# Patient Record
Sex: FEMALE | Race: WHITE | Employment: OTHER | URBAN - METROPOLITAN AREA
[De-identification: names, ages, dates, MRNs, and addresses within clinical notes are randomized per-mention and may not be internally consistent; named-entity substitution may affect disease eponyms.]

---

## 2019-03-25 ENCOUNTER — OFFICE VISIT (OUTPATIENT)
Dept: FAMILY MEDICINE CLINIC | Age: 79
End: 2019-03-25

## 2019-03-25 VITALS
TEMPERATURE: 98.1 F | RESPIRATION RATE: 18 BRPM | HEIGHT: 64 IN | DIASTOLIC BLOOD PRESSURE: 65 MMHG | OXYGEN SATURATION: 97 % | HEART RATE: 75 BPM | BODY MASS INDEX: 27.21 KG/M2 | SYSTOLIC BLOOD PRESSURE: 108 MMHG | WEIGHT: 159.4 LBS

## 2019-03-25 DIAGNOSIS — Z86.73 HISTORY OF CVA (CEREBROVASCULAR ACCIDENT): ICD-10-CM

## 2019-03-25 DIAGNOSIS — M35.00 SJOGREN'S SYNDROME WITHOUT EXTRAGLANDULAR INVOLVEMENT (HCC): ICD-10-CM

## 2019-03-25 DIAGNOSIS — J30.89 ENVIRONMENTAL AND SEASONAL ALLERGIES: ICD-10-CM

## 2019-03-25 DIAGNOSIS — I10 ESSENTIAL HYPERTENSION: Primary | ICD-10-CM

## 2019-03-25 DIAGNOSIS — E78.5 HYPERLIPIDEMIA, UNSPECIFIED HYPERLIPIDEMIA TYPE: ICD-10-CM

## 2019-03-25 DIAGNOSIS — L93.0 LUPUS ERYTHEMATOSUS, UNSPECIFIED FORM: ICD-10-CM

## 2019-03-25 DIAGNOSIS — R21 RASH: ICD-10-CM

## 2019-03-25 DIAGNOSIS — F51.01 PRIMARY INSOMNIA: ICD-10-CM

## 2019-03-25 RX ORDER — ZOLPIDEM TARTRATE 12.5 MG/1
12.5 TABLET, FILM COATED, EXTENDED RELEASE ORAL
COMMUNITY
End: 2019-03-25 | Stop reason: DRUGHIGH

## 2019-03-25 RX ORDER — AMLODIPINE BESYLATE 10 MG/1
TABLET ORAL DAILY
COMMUNITY
End: 2019-03-25 | Stop reason: SDUPTHER

## 2019-03-25 RX ORDER — DULOXETIN HYDROCHLORIDE 20 MG/1
20 CAPSULE, DELAYED RELEASE ORAL
COMMUNITY

## 2019-03-25 RX ORDER — ZOLPIDEM TARTRATE 5 MG/1
5 TABLET ORAL
Qty: 30 TAB | Refills: 3 | Status: SHIPPED | OUTPATIENT
Start: 2019-03-25 | End: 2019-10-06 | Stop reason: SDUPTHER

## 2019-03-25 RX ORDER — HYDROGEN PEROXIDE 3 %
20 SOLUTION, NON-ORAL MISCELLANEOUS
COMMUNITY

## 2019-03-25 RX ORDER — ROSUVASTATIN CALCIUM 10 MG/1
10 TABLET, COATED ORAL
COMMUNITY
End: 2019-03-25 | Stop reason: SDUPTHER

## 2019-03-25 RX ORDER — CETIRIZINE HCL 10 MG
10 TABLET ORAL DAILY
Qty: 90 TAB | Refills: 0 | Status: SHIPPED | OUTPATIENT
Start: 2019-03-25 | End: 2019-10-06

## 2019-03-25 RX ORDER — ROSUVASTATIN CALCIUM 5 MG/1
5 TABLET, COATED ORAL DAILY
Qty: 90 TAB | Refills: 3 | Status: SHIPPED | OUTPATIENT
Start: 2019-03-25 | End: 2020-04-15

## 2019-03-25 RX ORDER — AMLODIPINE BESYLATE 10 MG/1
10 TABLET ORAL DAILY
Qty: 90 TAB | Refills: 3 | Status: SHIPPED | OUTPATIENT
Start: 2019-03-25 | End: 2019-05-08 | Stop reason: SINTOL

## 2019-03-25 RX ORDER — FLUTICASONE PROPIONATE 50 MCG
2 SPRAY, SUSPENSION (ML) NASAL DAILY
Qty: 1 BOTTLE | Refills: 3 | Status: SHIPPED | OUTPATIENT
Start: 2019-03-25

## 2019-03-25 RX ORDER — HYDROCHLOROTHIAZIDE 12.5 MG/1
12.5 CAPSULE ORAL DAILY
COMMUNITY
End: 2019-03-25 | Stop reason: SDUPTHER

## 2019-03-25 RX ORDER — HYDROCHLOROTHIAZIDE 12.5 MG/1
12.5 CAPSULE ORAL DAILY
Qty: 90 CAP | Refills: 3 | Status: SHIPPED | OUTPATIENT
Start: 2019-03-25 | End: 2020-05-04 | Stop reason: SDUPTHER

## 2019-03-25 RX ORDER — HYDROXYCHLOROQUINE SULFATE 200 MG/1
TABLET, FILM COATED ORAL
COMMUNITY

## 2019-03-25 NOTE — PROGRESS NOTES
Angelic Angeles is a 67 y.o. female  Chief Complaint   Patient presents with   [address]     66 y/o female patient here today to establish care, patient is a former patient of Dr. Chetan Merchant in [place], recently moved    Rash     left arm red and blotchy, states feels like sand paper x3 months,      Visit Vitals  /65 (BP 1 Location: Right arm, BP Patient Position: Sitting)   Pulse 75   Temp 98.1 °F (36.7 °C) (Oral)   Resp 18   Ht 5' 4\" (1.626 m)   Wt 159 lb 6.4 oz (72.3 kg)   SpO2 97%   BMI 27.36 kg/m²     1. Have you been to the ER, urgent care clinic since your last visit? Hospitalized since your last visit? No    2. Have you seen or consulted any other health care providers outside of the [address] [place] [address] since your last visit? Include any pap smears or colon screening.  No  Health Maintenance Due   Topic Date Due    DTaP/Tdap/Td series (1 - Tdap) 02/18/1962    Shingrix Vaccine Age 50> (1 of 2) 02/18/1991    GLAUCOMA SCREENING Q2Y  02/18/2006    Bone Densitometry (Dexa) Screening  02/18/2006    Pneumococcal 65+ years (1 of 2 - PCV13) 02/18/2006    Influenza Age 9 to Adult  08/01/2018

## 2019-03-25 NOTE — PROGRESS NOTES
History of Present Illness:     Chief Complaint   Patient presents with   [address]     68 y/o female patient here today to establish care, patient is a former patient of Dr. Eliot Denver in [place], recently moved    Rash     left arm red and blotchy, states feels like sand paper x3 months,      Pt is a 67y.o. year old female    Presents to clinic for establishment of care.   Previously followed by Dr. Eliot Denver in [place]  Recently moved her to be closer to her son    C/o rash today  Started 3 months ago  Red, patches scattered on her arms and body  Tried eczema creams, [employer]  No new exposures  Uses Dove soap  Only known allergy is shelled fish    Lupus  Diagnosed at age 33  Taking Hydroxychloroquine   Followed by Rheumatology (Dr. Davida Keller)     Sjogren's syndrome  Dx 25 years ago    Prior stroke  10 years ago  Regained function of her left arm after that  Previously on ASA but stopped due to kidney disease  Taking Crestor    HTN  Taking HCTZ and Amlodipine    GERD  Using PPI which helps    Neuropathic pain  Taking Cymbalta    Insomnia  Takes Ambien nightly for sleep    I have reviewed patient's history as detailed below:    Past Medical History:   Diagnosis Date    CVA (cerebral vascular accident) ([address] 2009    Lupus     [facility]'s [facility]     Sjogren's syndrome ([address]          Past Surgical History:   Procedure Laterality Date    HX BREAST REDUCTION      HX BUNIONECTOMY      HX HYSTERECTOMY      HX KNEE REPLACEMENT      HX LUMBAR FUSION      HX LUMBAR LAMINECTOMY      HX ROTATOR CUFF REPAIR           Family History   Problem Relation Age of Onset    Diabetes Mother     Heart Attack Father          Social History     Socioeconomic History    Marital status:      Spouse name: Not on file    Number of children: Not on file    Years of education: Not on file    Highest education level: Not on file   Occupational History    Not on file   Social Needs    Financial resource strain: Not on file    Food insecurity:     Worry: Not on file     Inability: Not on file    Transportation needs:     Medical: Not on file     Non-medical: Not on file   Tobacco Use    Smoking status: Never Smoker    Smokeless tobacco: Never Used   Substance and Sexual Activity    Alcohol use: Yes     Comment: social    Drug use: Never    Sexual activity: Not Currently   Lifestyle    Physical activity:     Days per week: Not on file     Minutes per session: Not on file    Stress: Not on file   Relationships    Social connections:     Talks on phone: Not on file     Gets together: Not on file     Attends Buddhist service: Not on file     Active member of club or organization: Not on file     Attends meetings of clubs or organizations: Not on file     Relationship status: Not on file    Intimate partner violence:     Fear of current or ex partner: Not on file     Emotionally abused: Not on file     Physically abused: Not on file     Forced sexual activity: Not on file   Other Topics Concern    Not on file   Social History Narrative    Not on file         Current Outpatient Medications on File Prior to Visit   Medication Sig Dispense Refill    DULoxetine (CYMBALTA) 20 mg capsule Take 20 mg by mouth.  hydroxychloroquine (PLAQUENIL) 200 mg tablet Take  by mouth.  esomeprazole (NEXIUM) 20 mg capsule Take 20 mg by mouth. No current facility-administered medications on file prior to visit. Allergies: Allergies   Allergen Reactions    Codeine Anaphylaxis    Penicillins Anaphylaxis    Adhesive Tape-Silicones Other (comments)    Methotrexate Contact Dermatitis    Povidone-Iodine Other (comments)    Sulfa (Sulfonamide Antibiotics) Nausea and Vomiting         Review of systems:  Items bolded if positive.   Constitutional: Fever, chills, night sweats, weight loss, lymphadenopathy, fatigue  HEENT: Vision change, eye pain, rhinorrhea, sinus pain, epistaxis, dysphagia, change in hearing, tinnitus, vertigo. Endocrine: Weight change, heat/ cold intolerance, tremor, insomnia, polyuria, polydipsia, polyphagia, abnl hair growth, nail changes  Cardiovascular: Chest pain, palpitations, syncope, lower extremity edema, orthopnea, paroxysmal nocturnal dyspnea  Pulmonary: Shortness of breath, dyspnea on exertion, cough, hemoptysis, wheezing  GI: Nausea, vomiting, diarrhea, melena, hematochezia, change in appetite, abdominal pain, change in bowel habits or stools  : Dysuria, frequency, urgency, incontinence, hematuria, nocturia  Musculoskeletal: joint swelling or pain, muscle pain, back pain  Skin:  Rash, New/growing/changing skin lesions  Neurologic: Headache, muscle weakness, paresthesias, anesthesia, ataxia, change in speech, change in gait   Psychiatric: depression, anxiety, hallucinations, yo, SI/HI      Objective:     Vitals:    03/25/19 1001   BP: 108/65   Pulse: 75   Resp: 18   Temp: 98.1 °F (36.7 °C)   TempSrc: Oral   SpO2: 97%   Weight: 159 lb 6.4 oz (72.3 kg)   Height: 5' 4\" (1.626 m)       Physical Exam:  General appearance - alert, well appearing, and in no distress  Mental status - alert, oriented to person, place, and time, normal mood, behavior, speech, dress, motor activity, and thought processes  Ears - bilateral TM's and external ear canals normal  Nose - normal and patent, no erythema, discharge or polyps  Mouth - mucous membranes moist, pharynx normal without lesions  Chest - clear to auscultation, no wheezes, rales or rhonchi, symmetric air entry  Heart - normal rate, regular rhythm, normal S1, S2, no murmurs, rubs, clicks or gallops  Abdomen - soft, nontender, nondistended, no masses or organomegaly  Neurological - alert, oriented, normal speech, no focal findings or movement disorder noted  Extremities - peripheral pulses normal, no pedal edema, no clubbing or cyanosis  Skin - Erythematous, rough flat patches scattered along her arms.        Recent Labs:  No results found for this or any previous visit (from the past 12 hour(s)). Assessment and Plan:   Pt is a 67y.o. year old female,      ICD-10-CM ICD-9-CM    1. Essential hypertension I10 401.9 hydroCHLOROthiazide (MICROZIDE) 12.5 mg capsule      amLODIPine (NORVASC) 10 mg tablet   2. History of CVA (cerebrovascular accident) Z86.73 V12.54    3. Lupus erythematosus, unspecified form L93.0 695.4    4. Sjogren's syndrome without extraglandular involvement ([facility] [address] M35.00 710.2    5. Primary insomnia F51.01 307.42 zolpidem (AMBIEN) 5 mg tablet   6. Hyperlipidemia, unspecified hyperlipidemia type E78.5 272.4 rosuvastatin (CRESTOR) 5 mg tablet   7. Environmental and seasonal allergies J30.89 477.8 fluticasone propionate (FLONASE) 50 mcg/actuation nasal spray      cetirizine (ZYRTEC) 10 mg tablet   8. Rash R21 782.1 cetirizine (ZYRTEC) 10 mg tablet     Reviewed diagnoses and active problems  Refilled needed medications    Will trial on Zyrtec for rash; appears to be reactive  Can trial Hydrocortisone cream for itch    Will need to get her prior records    Follow up in 6 months    Nissa Cee MD     I have discussed the diagnosis with the patient and the intended plan as seen in the above orders. The patient has received an after-visit summary and questions were answered concerning future plans.

## 2019-03-25 NOTE — PATIENT INSTRUCTIONS
Allergies: Care Instructions  Your Care Instructions    Allergies occur when your body's defense system (immune system) overreacts to certain substances. The immune system treats a harmless substance as if it were a harmful germ or virus. Many things can cause this overreaction, including pollens, medicine, food, dust, animal dander, and mold. Allergies can be mild or severe. Mild allergies can be managed with home treatment. But medicine may be needed to prevent problems. Managing your allergies is an important part of staying healthy. Your doctor may suggest that you have allergy testing to help find out what is causing your allergies. When you know what things trigger your symptoms, you can avoid them. This can prevent allergy symptoms and other health problems. For severe allergies that cause reactions that affect your whole body (anaphylactic reactions), your doctor may prescribe a shot of epinephrine to carry with you in case you have a severe reaction. Learn how to give yourself the shot and keep it with you at all times. Make sure it is not . Follow-up care is a key part of your treatment and safety. Be sure to make and go to all appointments, and call your doctor if you are having problems. It's also a good idea to know your test results and keep a list of the medicines you take. How can you care for yourself at home? · If you have been told by your doctor that dust or dust mites are causing your allergy, decrease the dust around your bed:  ? Wash sheets, pillowcases, and other bedding in hot water every week. ? Use dust-proof covers for pillows, duvets, and mattresses. Avoid plastic covers because they tear easily and do not \"breathe. \" Wash as instructed on the label. ? Do not use any blankets and pillows that you do not need. ? Use blankets that you can wash in your washing machine. ? Consider removing drapes and carpets, which attract and hold dust, from your bedroom.   · If you are allergic to house dust and mites, do not use home humidifiers. Your doctor can suggest ways you can control dust and mites. · Look for signs of cockroaches. Cockroaches cause allergic reactions. Use cockroach baits to get rid of them. Then, clean your home well. Cockroaches like areas where grocery bags, newspapers, empty bottles, or cardboard boxes are stored. Do not keep these inside your home, and keep trash and food containers sealed. Seal off any spots where cockroaches might enter your home. · If you are allergic to mold, get rid of furniture, rugs, and drapes that smell musty. Check for mold in the bathroom. · If you are allergic to outdoor pollen or mold spores, use air-conditioning. Change or clean all filters every month. Keep windows closed. · If you are allergic to pollen, stay inside when pollen counts are high. Use a vacuum  with a HEPA filter or a double-thickness filter at least two times each week. · Stay inside when air pollution is bad. Avoid paint fumes, perfumes, and other strong odors. · Avoid conditions that make your allergies worse. Stay away from smoke. Do not smoke or let anyone else smoke in your house. Do not use fireplaces or wood-burning stoves. · If you are allergic to your pets, change the air filter in your furnace every month. Use high-efficiency filters. · If you are allergic to pet dander, keep pets outside or out of your bedroom. Old carpet and cloth furniture can hold a lot of animal dander. You may need to replace them. When should you call for help? Give an epinephrine shot if:    · You think you are having a severe allergic reaction.     · You have symptoms in more than one body area, such as mild nausea and an itchy mouth.    After giving an epinephrine shot call 911, even if you feel better.   Call 911 if:    · You have symptoms of a severe allergic reaction. These may include:  ? Sudden raised, red areas (hives) all over your body. ?  Swelling of the throat, mouth, lips, or tongue. ? Trouble breathing. ? Passing out (losing consciousness). Or you may feel very lightheaded or suddenly feel weak, confused, or restless.     · You have been given an epinephrine shot, even if you feel better.    Call your doctor now or seek immediate medical care if:    · You have symptoms of an allergic reaction, such as:  ? A rash or hives (raised, red areas on the skin). ? Itching. ? Swelling. ? Belly pain, nausea, or vomiting.    Watch closely for changes in your health, and be sure to contact your doctor if:    · You do not get better as expected. Where can you learn more? Go to http://fredrick-rodriguez.info/. Enter L633 in the search box to learn more about \"Allergies: Care Instructions. \"  Current as of: June 27, 2018  Content Version: 11.9  © 3932-6573 [employer]. Care instructions adapted under license by [employer] (which disclaims liability or warranty for this information). If you have questions about a medical condition or this instruction, always ask your healthcare professional. [address] any warranty or liability for your use of this information.

## 2019-03-26 PROBLEM — D50.9 MICROCYTIC ANEMIA: Status: ACTIVE | Noted: 2019-03-26

## 2019-03-26 PROBLEM — M32.9 SYSTEMIC LUPUS ERYTHEMATOSUS (HCC): Status: ACTIVE | Noted: 2019-03-25

## 2019-03-26 PROBLEM — N18.30 CKD (CHRONIC KIDNEY DISEASE) STAGE 3, GFR 30-59 ML/MIN (HCC): Status: ACTIVE | Noted: 2019-03-26

## 2019-03-26 PROBLEM — E87.1 HYPONATREMIA: Status: ACTIVE | Noted: 2019-03-26

## 2019-05-02 ENCOUNTER — OFFICE VISIT (OUTPATIENT)
Dept: FAMILY MEDICINE CLINIC | Age: 79
End: 2019-05-02

## 2019-05-02 VITALS
WEIGHT: 166 LBS | HEIGHT: 64 IN | TEMPERATURE: 98.4 F | OXYGEN SATURATION: 99 % | BODY MASS INDEX: 28.34 KG/M2 | DIASTOLIC BLOOD PRESSURE: 69 MMHG | RESPIRATION RATE: 18 BRPM | HEART RATE: 80 BPM | SYSTOLIC BLOOD PRESSURE: 118 MMHG

## 2019-05-02 DIAGNOSIS — R60.0 BILATERAL LEG EDEMA: Primary | ICD-10-CM

## 2019-05-02 DIAGNOSIS — Z78.9 HISTORY OF RECENT AIR TRAVEL: ICD-10-CM

## 2019-05-02 RX ORDER — FUROSEMIDE 20 MG/1
20 TABLET ORAL DAILY
Qty: 3 TAB | Refills: 0 | Status: SHIPPED | OUTPATIENT
Start: 2019-05-02 | End: 2019-05-05

## 2019-05-02 NOTE — PROGRESS NOTES
Randa Painter  67 y.o. female  1940  [address] [facility]  <[id number]>   [address]: Progress Note  Cindy Huynh MD       Encounter Date: 5/2/2019    Chief Complaint   Patient presents with    Leg Swelling     History of Present Illness   Randa Painter is a 67 y.o. female who presents to clinic today for for concerns of leg swelling bilaterally. Patient was recent air travel to [facility]. While she was there she did a lot of walking. Notes this was an enjoyable trip but since she is return she has increased edema of her lower extremities. Denies significant pain at this time. Denies redness at this time. Review of Systems   Review of Systems - Cardiovascular ROS: + lower extremity edema. Denies orthopnea, PND, dyspnea on exertion. Denies shortness of breath. Denies lower extremity erythema or pain. Vitals/Objective:     Vitals:    05/02/19 1901   BP: 118/69   Pulse: 80   Resp: 18   Temp: 98.4 °F (36.9 °C)   TempSrc: Oral   SpO2: 99%   Weight: 166 lb (75.3 kg)   Height: 5' 4\" (1.626 m)     Body mass index is 28.49 kg/m². General: Patient alert and oriented and in NAD  Heart: Regular rate and rhythm, No murmurs, rubs or gallops. 2+ peripheral pulses  Lungs: Clear to auscultation bilaterally, no wheezing, rales or rhonchi  Ext: 1-2+ pitting edema lower extremities bilaterally. Skin: No rashes or lesions noted on exposed skin      Assessment and Plan:   1. Bilateral leg edema  Patient increased risk of lower extremity DVT given recent air travel. Will get lower extremity Doppler. Also follow-up on metabolic causes for lower extremity edema to rule these out. Given 3 days of Lasix. I will follow-up with her once I get the results back from her duplex. Patient is on amlodipine for hypertension which could potentially play a role, however again I would like to make sure we rule out DVT given her risk.  - furosemide (LASIX) 20 mg tablet;  Take 1 Tab by mouth daily for 3 days. Dispense: 3 Tab; Refill: 0  - DUPLEX LOWER EXT VENOUS BILAT; Future  - BNP  - HGB & HCT  - METABOLIC PANEL, COMPREHENSIVE    2. History of recent air travel        I have discussed the diagnosis with the patient and the intended plan as seen in the above orders. she has expressed understanding. The patient has received an after-visit summary and questions were answered concerning future plans. I have discussed medication side effects and warnings with the patient as well. Electronically Signed: Cristofer Jennings MD     History   Patients past medical, surgical and family histories were reviewed and updated.     Past Medical History:   Diagnosis Date    CVA (cerebral vascular accident) ([facility] [address] 2009    Lupus ([facility] [address] [facility]     Sjogren's disease ([facility]     Sjogren's syndrome ([facility] [address] [facility]      Past Surgical History:   Procedure Laterality Date    HX BREAST REDUCTION      HX BUNIONECTOMY      HX HYSTERECTOMY      HX KNEE REPLACEMENT      HX LUMBAR FUSION      HX LUMBAR LAMINECTOMY      HX ROTATOR CUFF REPAIR       Family History   Problem Relation Age of Onset    Diabetes Mother     Heart Attack Father      Social History     Socioeconomic History    Marital status:      Spouse name: Not on file    Number of children: Not on file    Years of education: Not on file    Highest education level: Not on file   Occupational History    Not on file   Social Needs    Financial resource strain: Not on file    Food insecurity:     Worry: Not on file     Inability: Not on file    Transportation needs:     Medical: Not on file     Non-medical: Not on file   Tobacco Use    Smoking status: Never Smoker    Smokeless tobacco: Never Used   Substance and Sexual Activity    Alcohol use: Yes     Comment: social    Drug use: Never    Sexual activity: Not Currently   Lifestyle    Physical activity:     Days per week: Not on file     Minutes per session: Not on file    Stress: Not on file   Relationships    Social connections:     Talks on phone: Not on file     Gets together: Not on file     Attends U[employer] service: Not on file     Active member of club or organization: Not on file     Attends meetings of clubs or organizations: Not on file     Relationship status: Not on file    Intimate partner violence:     Fear of current or ex partner: Not on file     Emotionally abused: Not on file     Physically abused: Not on file     Forced sexual activity: Not on file   Other Topics Concern    Not on file   Social History Narrative    Not on file            Current Medications/Allergies     Current Outpatient Medications   Medication Sig Dispense Refill    furosemide (LASIX) 20 mg tablet Take 1 Tab by mouth daily for 3 days. 3 Tab 0    DULoxetine (CYMBALTA) 20 mg capsule Take 20 mg by mouth.  hydroxychloroquine (PLAQUENIL) 200 mg tablet Take  by mouth.  esomeprazole (NEXIUM) 20 mg capsule Take 20 mg by mouth.  rosuvastatin (CRESTOR) 5 mg tablet Take 1 Tab by mouth daily. 90 Tab 3    zolpidem (AMBIEN) 5 mg tablet Take 1 Tab by mouth nightly as needed for Sleep. Max Daily Amount: 5 mg. 30 Tab 3    hydroCHLOROthiazide (MICROZIDE) 12.5 mg capsule Take 1 Cap by mouth daily. 90 Cap 3    amLODIPine (NORVASC) 10 mg tablet Take 1 Tab by mouth daily. 90 Tab 3    fluticasone propionate (FLONASE) 50 mcg/actuation nasal spray 2 Sprays by Both Nostrils route daily. 1 Bottle 3    cetirizine (ZYRTEC) 10 mg tablet Take 1 Tab by mouth daily.  90 Tab 0     Allergies   Allergen Reactions    Codeine Anaphylaxis    Penicillins Anaphylaxis    Adhesive Tape-Silicones Other (comments)    Methotrexate Contact Dermatitis    Povidone-Iodine Other (comments)    Sulfa (Sulfonamide Antibiotics) Nausea and Vomiting

## 2019-05-02 NOTE — PATIENT INSTRUCTIONS
Leg and Ankle Edema: Care Instructions Your Care Instructions Swelling in the legs, ankles, and feet is called edema. It is common after you sit or stand for a while. Long plane flights or car rides often cause swelling in the legs and feet. You may also have swelling if you have to stand for long periods of time at your job. Problems with the veins in the legs (varicose veins) and changes in hormones can also cause swelling. Sometimes the swelling in the ankles and feet is caused by a more serious problem, such as heart failure, infection, blood clots, or liver or kidney disease. Follow-up care is a key part of your treatment and safety. Be sure to make and go to all appointments, and call your doctor if you are having problems. It's also a good idea to know your test results and keep a list of the medicines you take. How can you care for yourself at home? · If your doctor gave you medicine, take it as prescribed. Call your doctor if you think you are having a problem with your medicine. · Whenever you are resting, raise your legs up. Try to keep the swollen area higher than the level of your heart. · Take breaks from standing or sitting in one position. ? Walk around to increase the blood flow in your lower legs. ? Move your feet and ankles often while you stand, or tighten and relax your leg muscles. · Wear support stockings. Put them on in the morning, before swelling gets worse. · Eat a balanced diet. Lose weight if you need to. · Limit the amount of salt (sodium) in your diet. Salt holds fluid in the body and may increase swelling. When should you call for help? Call 911 anytime you think you may need emergency care. For example, call if: 
  · You have symptoms of a blood clot in your lung (called a pulmonary embolism). These may include: 
? Sudden chest pain. ? Trouble breathing. ? Coughing up blood.  
 Call your doctor now or seek immediate medical care if:   · You have signs of a blood clot, such as: 
? Pain in your calf, back of the knee, thigh, or groin. ? Redness and swelling in your leg or groin.  
  · You have symptoms of infection, such as: 
? Increased pain, swelling, warmth, or redness. ? Red streaks or pus. ? A fever.  
 Watch closely for changes in your health, and be sure to contact your doctor if: 
  · Your swelling is getting worse.  
  · You have new or worsening pain in your legs.  
  · You do not get better as expected. Where can you learn more? Go to http://fredrick-rodriguez.info/. Enter O584 in the search box to learn more about \"Leg and Ankle Edema: Care Instructions. \" Current as of: September 23, 2018 Content Version: 11.9 © 2976-0257 [employer]. Care instructions adapted under license by [employer] (which disclaims liability or warranty for this information). If you have questions about a medical condition or this instruction, always ask your healthcare professional. [place] [address] any warranty or liability for your use of this information.

## 2019-05-03 ENCOUNTER — HOSPITAL ENCOUNTER (OUTPATIENT)
Dept: LAB | Age: 79
Discharge: HOME | End: 2019-05-03
Payer: MEDICARE

## 2019-05-03 ENCOUNTER — HOSPITAL ENCOUNTER (OUTPATIENT)
Dept: VASCULAR SURGERY | Age: 79
Discharge: HOME | End: 2019-05-03
Attending: FAMILY MEDICINE
Payer: MEDICARE

## 2019-05-03 ENCOUNTER — LAB ONLY (OUTPATIENT)
Dept: FAMILY MEDICINE CLINIC | Age: 79
End: 2019-05-03

## 2019-05-03 DIAGNOSIS — R60.0 BILATERAL LEG EDEMA: ICD-10-CM

## 2019-05-03 PROCEDURE — 83880 ASSAY OF NATRIURETIC PEPTIDE: CPT

## 2019-05-03 PROCEDURE — 85018 HEMOGLOBIN: CPT

## 2019-05-03 PROCEDURE — 80053 COMPREHEN METABOLIC PANEL: CPT

## 2019-05-03 PROCEDURE — 36415 COLL VENOUS BLD VENIPUNCTURE: CPT

## 2019-05-03 PROCEDURE — 93970 EXTREMITY STUDY: CPT

## 2019-05-04 LAB
ALBUMIN SERPL-MCNC: 3.9 G/DL (ref 3.5–4.8)
ALBUMIN/GLOB SERPL: 1.3 {RATIO} (ref 1.2–2.2)
ALP SERPL-CCNC: 192 IU/L (ref 39–117)
ALT SERPL-CCNC: 12 IU/L (ref 0–32)
AST SERPL-CCNC: 27 IU/L (ref 0–40)
BILIRUB SERPL-MCNC: 0.4 MG/DL (ref 0–1.2)
BNP SERPL-MCNC: 57.4 PG/ML (ref 0–100)
BUN SERPL-MCNC: 17 MG/DL (ref 8–27)
BUN/CREAT SERPL: 15 (ref 12–28)
CALCIUM SERPL-MCNC: 9 MG/DL (ref 8.7–10.3)
CHLORIDE SERPL-SCNC: 99 MMOL/L (ref 96–106)
CO2 SERPL-SCNC: 24 MMOL/L (ref 20–29)
CREAT SERPL-MCNC: 1.15 MG/DL (ref 0.57–1)
GLOBULIN SER CALC-MCNC: 3 G/DL (ref 1.5–4.5)
GLUCOSE SERPL-MCNC: 107 MG/DL (ref 65–99)
HCT VFR BLD AUTO: 34.3 % (ref 34–46.6)
HGB BLD-MCNC: 10.3 G/DL (ref 11.1–15.9)
INTERPRETATION: NORMAL
POTASSIUM SERPL-SCNC: 4.1 MMOL/L (ref 3.5–5.2)
PROT SERPL-MCNC: 6.9 G/DL (ref 6–8.5)
SODIUM SERPL-SCNC: 136 MMOL/L (ref 134–144)

## 2019-05-06 ENCOUNTER — TELEPHONE (OUTPATIENT)
Dept: FAMILY MEDICINE CLINIC | Age: 79
End: 2019-05-06

## 2019-05-06 NOTE — TELEPHONE ENCOUNTER
Attempted to notify patient of negative LE doppler U/S. Number available not accepting calls at this time. Will attempt to call back later.

## 2019-05-07 NOTE — TELEPHONE ENCOUNTER
Dr. Ashleigh Hi: Today   Message Contents   Charlane Kawasaki, [address]             Pt is inquiring about her lab results from her recent visit.  Best contact number is [phone].

## 2019-05-08 NOTE — TELEPHONE ENCOUNTER
I sent a [employer] message to patient. Was unable to leave a message. Relayed note from Dr Omari Regan.

## 2019-05-08 NOTE — TELEPHONE ENCOUNTER
Please let patient know:  Okay to stop amlodipine. If patient can check blood pressure at home, please do this once daily for 2 weeks after stopping medication. If BP averages over 140/90, she should call us, as we may want to increase her hydrochlorothiazide. If she does not have a way to check her blood pressure, she should schedule an appointment in 1-2 weeks for a blood pressure follow-up.

## 2019-05-16 ENCOUNTER — OFFICE VISIT (OUTPATIENT)
Dept: FAMILY MEDICINE CLINIC | Age: 79
End: 2019-05-16

## 2019-05-16 VITALS
BODY MASS INDEX: 28 KG/M2 | TEMPERATURE: 98 F | DIASTOLIC BLOOD PRESSURE: 80 MMHG | OXYGEN SATURATION: 100 % | SYSTOLIC BLOOD PRESSURE: 147 MMHG | WEIGHT: 164 LBS | HEART RATE: 69 BPM | HEIGHT: 64 IN | RESPIRATION RATE: 16 BRPM

## 2019-05-16 DIAGNOSIS — R60.0 BILATERAL LEG EDEMA: Primary | ICD-10-CM

## 2019-05-16 DIAGNOSIS — I10 ESSENTIAL HYPERTENSION: ICD-10-CM

## 2019-05-16 NOTE — PROGRESS NOTES
Identified Patient with two Patient identifiers (Name and ). Two Patient Identifiers confirmed. Reviewed record in preparation for visit and have obtained necessary documentation. Chief Complaint   Patient presents with   Lethea Parent Dr. Caitlyn Allen (rheumatologist) last Tuesday - had swelling in both lower legs, he told her to stop taking amlodipine - patient has not taken in 10 days but still has some swelling present       Visit Vitals  /80 (BP 1 Location: Right arm, BP Patient Position: Sitting)   Pulse 69   Temp 98 °F (36.7 °C) (Oral)   Resp 16   Ht 5' 4\" (1.626 m)   Wt 164 lb (74.4 kg)   SpO2 100%   BMI 28.15 kg/m²       1. Have you been to the ER, urgent care clinic since your last visit? Hospitalized since your last visit? No    2. Have you seen or consulted any other health care providers outside of the [address] [place] [address] since your last visit? Include any pap smears or colon screening.  No

## 2019-05-16 NOTE — PROGRESS NOTES
Sammy Morales  67 y.o. female  1940  [address] [facility]  [id number]   [address]: Progress Note  Antonio Pérez MD       Encounter Date: 5/16/2019    Chief Complaint   Patient presents with   Isreal Velez (rheumatologist) last Tuesday - had swelling in both lower legs, he told her to stop taking amlodipine - patient has not taken in 10 days but still has some swelling present     History of Present Illness   Sammy Morales is a 67 y.o. female who presents to clinic today for follow-up of lower extremity edema. Duplex ultrasounds of her lower extremities were negative. Patient had discussed swelling with her rheumatologist, given the findings also believes this was secondary to her amlodipine. He stopped her amlodipine. She has not taken this in 10 days. Continues to have some S lower extremity edema. Denies dyspnea, chest pain, orthopnea, PND. Urine output is remained stable. Review of Systems   Review of Systems -bilateral lower extremity edema  Vitals/Objective:     Vitals:    05/16/19 1059   BP: 147/80   Pulse: 69   Resp: 16   Temp: 98 °F (36.7 °C)   TempSrc: Oral   SpO2: 100%   Weight: 164 lb (74.4 kg)   Height: 5' 4\" (1.626 m)     Body mass index is 28.15 kg/m². General: Patient alert and oriented and in NAD  HEENT: PER/EOMI, no conjunctival pallor or scleral icterus. No thyromegaly or cervical lymphadenopathy  Heart: Regular rate and rhythm, No murmurs, rubs or gallops. 2+ peripheral pulses  Lungs: Clear to auscultation bilaterally, no wheezing, rales or rhonchi  Abd: +BS, non-tender, non-distended  Ext: No edema  Skin: No rashes or lesions noted on exposed skin,  Psych: Appropriate mood and affect      Assessment and Plan:   1. Bilateral leg edema  Likely combination of amlodipine and venous stasis. Recommend use of compression hose and elevation at this time. Will avoid amlodipine in the future.     2. Essential hypertension  Given patient's age and medical history blood pressures less than 140-150/90 recommended. Currently in this range after stopping the Norvasc. Recommend continuing checking her blood pressure at home and to notify office of her blood pressures are consistently over 150/90. We may need to add back a second agent to lower blood pressure. I have discussed the diagnosis with the patient and the intended plan as seen in the above orders. she has expressed understanding. The patient has received an after-visit summary and questions were answered concerning future plans. I have discussed medication side effects and warnings with the patient as well. Follow-up and Dispositions  ·   Return in about 6 months (around 11/16/2019), or if symptoms worsen or fail to improve. Electronically Signed: Lauro Kang MD     History   Patients past medical, surgical and family histories were reviewed and updated.     Past Medical History:   Diagnosis Date    CVA (cerebral vascular accident) ([address] 2009    Lupus ([address]     Sjogren's disease ([address]     Sjogren's syndrome ([address]      Past Surgical History:   Procedure Laterality Date    HX BREAST REDUCTION      HX BUNIONECTOMY      HX HYSTERECTOMY      HX KNEE REPLACEMENT      HX LUMBAR FUSION      HX LUMBAR LAMINECTOMY      HX ROTATOR CUFF REPAIR       Family History   Problem Relation Age of Onset    Diabetes Mother     Heart Attack Father      Social History     Socioeconomic History    Marital status:      Spouse name: Not on file    Number of children: Not on file    Years of education: Not on file    Highest education level: Not on file   Occupational History    Not on file   Social Needs    Financial resource strain: Not on file    Food insecurity:     Worry: Not on file     Inability: Not on file    Transportation needs:     Medical: Not on file     Non-medical: Not on file   Tobacco Use    Smoking status: Never Smoker    Smokeless tobacco: Never Used   Substance and Sexual Activity    Alcohol use: Yes     Comment: social    Drug use: Never    Sexual activity: Not Currently   Lifestyle    Physical activity:     Days per week: Not on file     Minutes per session: Not on file    Stress: Not on file   Relationships    Social connections:     Talks on phone: Not on file     Gets together: Not on file     Attends Mandaen service: Not on file     Active member of club or organization: Not on file     Attends meetings of clubs or organizations: Not on file     Relationship status: Not on file    Intimate partner violence:     Fear of current or ex partner: Not on file     Emotionally abused: Not on file     Physically abused: Not on file     Forced sexual activity: Not on file   Other Topics Concern    Not on file   Social History Narrative    Not on file            Current Medications/Allergies     Current Outpatient Medications   Medication Sig Dispense Refill    DULoxetine (CYMBALTA) 20 mg capsule Take 20 mg by mouth.  hydroxychloroquine (PLAQUENIL) 200 mg tablet Take  by mouth.  esomeprazole (NEXIUM) 20 mg capsule Take 20 mg by mouth.  rosuvastatin (CRESTOR) 5 mg tablet Take 1 Tab by mouth daily. 90 Tab 3    zolpidem (AMBIEN) 5 mg tablet Take 1 Tab by mouth nightly as needed for Sleep. Max Daily Amount: 5 mg. 30 Tab 3    hydroCHLOROthiazide (MICROZIDE) 12.5 mg capsule Take 1 Cap by mouth daily. 90 Cap 3    fluticasone propionate (FLONASE) 50 mcg/actuation nasal spray 2 Sprays by Both Nostrils route daily. 1 Bottle 3    cetirizine (ZYRTEC) 10 mg tablet Take 1 Tab by mouth daily.  90 Tab 0     Allergies   Allergen Reactions    Codeine Anaphylaxis    Penicillins Anaphylaxis    Adhesive Tape-Silicones Other (comments)    Amlodipine Swelling     Lower leg swelling    Methotrexate Contact Dermatitis    Povidone-Iodine Other (comments)    Sulfa (Sulfonamide Antibiotics) Nausea and Vomiting

## 2019-05-16 NOTE — PATIENT INSTRUCTIONS
Leg and Ankle Edema: Care Instructions Your Care Instructions Swelling in the legs, ankles, and feet is called edema. It is common after you sit or stand for a while. Long plane flights or car rides often cause swelling in the legs and feet. You may also have swelling if you have to stand for long periods of time at your job. Problems with the veins in the legs (varicose veins) and changes in hormones can also cause swelling. Sometimes the swelling in the ankles and feet is caused by a more serious problem, such as heart failure, infection, blood clots, or liver or kidney disease. Follow-up care is a key part of your treatment and safety. Be sure to make and go to all appointments, and call your doctor if you are having problems. It's also a good idea to know your test results and keep a list of the medicines you take. How can you care for yourself at home? · If your doctor gave you medicine, take it as prescribed. Call your doctor if you think you are having a problem with your medicine. · Whenever you are resting, raise your legs up. Try to keep the swollen area higher than the level of your heart. · Take breaks from standing or sitting in one position. ? Walk around to increase the blood flow in your lower legs. ? Move your feet and ankles often while you stand, or tighten and relax your leg muscles. · Wear support stockings. Put them on in the morning, before swelling gets worse. · Eat a balanced diet. Lose weight if you need to. · Limit the amount of salt (sodium) in your diet. Salt holds fluid in the body and may increase swelling. When should you call for help? Call 911 anytime you think you may need emergency care. For example, call if: 
  · You have symptoms of a blood clot in your lung (called a pulmonary embolism). These may include: 
? Sudden chest pain. ? Trouble breathing. ? Coughing up blood.  
 Call your doctor now or seek immediate medical care if:   · You have signs of a blood clot, such as: 
? Pain in your calf, back of the knee, thigh, or groin. ? Redness and swelling in your leg or groin.  
  · You have symptoms of infection, such as: 
? Increased pain, swelling, warmth, or redness. ? Red streaks or pus. ? A fever.  
 Watch closely for changes in your health, and be sure to contact your doctor if: 
  · Your swelling is getting worse.  
  · You have new or worsening pain in your legs.  
  · You do not get better as expected. Where can you learn more? Go to http://fredrick-rodriguez.info/. Enter O634 in the search box to learn more about \"Leg and Ankle Edema: Care Instructions. \" Current as of: September 23, 2018 Content Version: 11.9 © 6645-0526 [employer]. Care instructions adapted under license by [employer] (which disclaims liability or warranty for this information). If you have questions about a medical condition or this instruction, always ask your healthcare professional. [place] [address] any warranty or liability for your use of this information.

## 2019-10-06 ENCOUNTER — OFFICE VISIT (OUTPATIENT)
Dept: FAMILY MEDICINE CLINIC | Age: 79
End: 2019-10-06

## 2019-10-06 VITALS
RESPIRATION RATE: 16 BRPM | HEART RATE: 80 BPM | HEIGHT: 64 IN | BODY MASS INDEX: 27.45 KG/M2 | OXYGEN SATURATION: 98 % | SYSTOLIC BLOOD PRESSURE: 116 MMHG | TEMPERATURE: 98.2 F | DIASTOLIC BLOOD PRESSURE: 75 MMHG | WEIGHT: 160.8 LBS

## 2019-10-06 DIAGNOSIS — B34.9 VIRAL ILLNESS: Primary | ICD-10-CM

## 2019-10-06 DIAGNOSIS — F51.01 PRIMARY INSOMNIA: ICD-10-CM

## 2019-10-06 DIAGNOSIS — R52 BODY ACHES: ICD-10-CM

## 2019-10-06 LAB
FLUAV+FLUBV AG NOSE QL IA.RAPID: NEGATIVE POS/NEG
FLUAV+FLUBV AG NOSE QL IA.RAPID: NEGATIVE POS/NEG
VALID INTERNAL CONTROL?: YES

## 2019-10-06 RX ORDER — ZOLPIDEM TARTRATE 5 MG/1
5 TABLET ORAL
Qty: 30 TAB | Refills: 3 | Status: SHIPPED | OUTPATIENT
Start: 2019-10-06 | End: 2020-02-06

## 2019-10-06 NOTE — PATIENT INSTRUCTIONS

## 2019-10-06 NOTE — PROGRESS NOTES
Michelle Griffin  67 y.o. female  1940  [address] [facility]  [id number]   [address]: Progress Note  Fareed Veliz MD       Encounter Date: 10/6/2019    Chief Complaint   Patient presents with    Generalized Body Aches     x 3 days     Fatigue     x 3 days      History of Present Illness   Michelle Griffin is a 67 y.o. female who presents to clinic today for 3 days of myalgias and fatigue. Notes fever with tmax 101.2F. Denies nasal congestion, cough, wheezing, abdominal pain, dysuria. Has had an increase in loose stools over the past 3 days. No known sick contacts. Taking tylenol for fever and myalgias. She is requesting refill for ambien. Her insomnia is well controlled on this medication and she uses this sparingly. Review of Systems   Review of Systems   Constitutional: Positive for fever and malaise/fatigue. Negative for chills, diaphoresis and weight loss. HENT: Negative for congestion, sinus pain and sore throat. Eyes: Negative. Respiratory: Negative for cough, sputum production, shortness of breath and stridor. Cardiovascular: Negative for chest pain and palpitations. Gastrointestinal: Positive for diarrhea. Negative for abdominal pain, blood in stool, nausea and vomiting. Genitourinary: Negative for dysuria, frequency and urgency. Musculoskeletal: Positive for myalgias. Vitals/Objective:     Vitals:    10/06/19 1017   BP: 116/75   Pulse: 80   Resp: 16   Temp: 98.2 °F (36.8 °C)   TempSrc: Oral   SpO2: 98%   Weight: 160 lb 12.8 oz (72.9 kg)   Height: 5' 4\" (1.626 m)     Body mass index is 27.6 kg/m². General: Patient alert and oriented and in NAD  HEENT: PER/EOMI, no conjunctival pallor or scleral icterus. No thyromegaly or cervical lymphadenopathy. TMs normal bilaterally. Posterior pharynx normal.  Nasal mucosa normal  Heart: Regular rate and rhythm, No murmurs, rubs or gallops.   2+ peripheral pulses  Lungs: Clear to auscultation bilaterally, no wheezing, rales or rhonchi  Abd: +BS, non-tender, non-distended  Ext: No edema  Skin: No rashes or lesions noted on exposed skin,  Psych: Appropriate mood and affect    Recent Results (from the past 24 hour(s))   AMB POC CHARLES INFLUENZA A/B TEST    Collection Time: 10/06/19 10:35 AM   Result Value Ref Range    VALID INTERNAL CONTROL POC Yes     Influenza A Ag POC Negative Negative Pos/Neg    Influenza B Ag POC Negative Negative Pos/Neg     Assessment and Plan:   1. Viral illness  Etiology unclear. Rapid flu negative. Only mild enteritis. Stressed importance of hydration. May continue tylenol for pain and fevers. Get rest.  If sx worsen or fail to improve, patient to return for further evaluation. 2. Body aches  - AMB POC CHARLES INFLUENZA A/B TEST    3. Primary insomnia  Refilled ambien today. - zolpidem (AMBIEN) 5 mg tablet; Take 1 Tab by mouth nightly as needed for Sleep. Max Daily Amount: 5 mg. Dispense: 30 Tab; Refill: 3      I have discussed the diagnosis with the patient and the intended plan as seen in the above orders. she has expressed understanding. The patient has received an after-visit summary and questions were answered concerning future plans. I have discussed medication side effects and warnings with the patient as well. Follow-up and Dispositions  ·   Return if symptoms worsen or fail to improve. Electronically Signed: Orvilla Lesch, MD     History   Patients past medical, surgical and family histories were reviewed and updated.     Past Medical History:   Diagnosis Date    [facility]cerebral vascular [facility]) [facility] 2009    Lupus ([facility]     Sjogren's disease ([facility]     Sjogren's syndrome ([facility]      Past Surgical History:   Procedure Laterality Date    HX BREAST REDUCTION      HX BUNIONECTOMY      HX HYSTERECTOMY      HX KNEE REPLACEMENT      HX LUMBAR FUSION      HX LUMBAR LAMINECTOMY      HX ROTATOR CUFF REPAIR       Family History   Problem Relation Age of Onset    Diabetes Mother     Heart Attack Father      Social History     Socioeconomic History    Marital status:      Spouse name: Not on file    Number of children: Not on file    Years of education: Not on file    Highest education level: Not on file   Occupational History    Not on file   Social Needs    Financial resource strain: Not on file    Food insecurity:     Worry: Not on file     Inability: Not on file    Transportation needs:     Medical: Not on file     Non-medical: Not on file   Tobacco Use    Smoking status: Never Smoker    Smokeless tobacco: Never Used   Substance and Sexual Activity    Alcohol use: Yes     Comment: social    Drug use: Never    Sexual activity: Not Currently   Lifestyle    Physical activity:     Days per week: Not on file     Minutes per session: Not on file    Stress: Not on file   Relationships    Social connections:     Talks on phone: Not on file     Gets together: Not on file     Attends Mosque service: Not on file     Active member of club or organization: Not on file     Attends meetings of clubs or organizations: Not on file     Relationship status: Not on file    Intimate partner violence:     Fear of current or ex partner: Not on file     Emotionally abused: Not on file     Physically abused: Not on file     Forced sexual activity: Not on file   Other Topics Concern    Not on file   Social History Narrative    Not on file            Current Medications/Allergies     Current Outpatient Medications   Medication Sig Dispense Refill    DULoxetine (CYMBALTA) 20 mg capsule Take 20 mg by mouth.  hydroxychloroquine (PLAQUENIL) 200 mg tablet Take  by mouth.  esomeprazole (NEXIUM) 20 mg capsule Take 20 mg by mouth.  rosuvastatin (CRESTOR) 5 mg tablet Take 1 Tab by mouth daily. 90 Tab 3    zolpidem (AMBIEN) 5 mg tablet Take 1 Tab by mouth nightly as needed for Sleep.  Max Daily Amount: 5 mg. 30 Tab 3  hydroCHLOROthiazide (MICROZIDE) 12.5 mg capsule Take 1 Cap by mouth daily. 90 Cap 3    fluticasone propionate (FLONASE) 50 mcg/actuation nasal spray 2 Sprays by Both Nostrils route daily. 1 Bottle 3    cetirizine (ZYRTEC) 10 mg tablet Take 1 Tab by mouth daily.  90 Tab 0     Allergies   Allergen Reactions    Codeine Anaphylaxis    Penicillins Anaphylaxis    Adhesive Tape-Silicones Other (comments)    Amlodipine Swelling     Lower leg swelling    Methotrexate Contact Dermatitis    Povidone-Iodine Other (comments)    Sulfa (Sulfonamide Antibiotics) Nausea and Vomiting

## 2019-10-06 NOTE — PROGRESS NOTES
Chief Complaint   Patient presents with    Generalized Body Aches     x 3 days     Fatigue     x 3 days      Blood pressure 116/75, pulse 80, temperature 98.2 °F (36.8 °C), temperature source Oral, resp. rate 16, height 5' 4\" (1.626 m), weight 160 lb 12.8 oz (72.9 kg), last menstrual period 05/02/1984, SpO2 98 %. 1. Have you been to the ER, urgent care clinic since your last visit? Hospitalized since your last visit? No    2. Have you seen or consulted any other health care providers outside of the [address] [place] [address] since your last visit? Include any pap smears or colon screening.  No

## 2019-10-10 ENCOUNTER — OFFICE VISIT (OUTPATIENT)
Dept: FAMILY MEDICINE CLINIC | Age: 79
End: 2019-10-10

## 2019-10-10 VITALS
BODY MASS INDEX: 26.63 KG/M2 | DIASTOLIC BLOOD PRESSURE: 51 MMHG | SYSTOLIC BLOOD PRESSURE: 104 MMHG | HEART RATE: 75 BPM | HEIGHT: 64 IN | OXYGEN SATURATION: 100 % | RESPIRATION RATE: 18 BRPM | WEIGHT: 156 LBS | TEMPERATURE: 99.5 F

## 2019-10-10 DIAGNOSIS — J02.9 SORE THROAT: Primary | ICD-10-CM

## 2019-10-10 LAB
S PYO AG THROAT QL: POSITIVE
VALID INTERNAL CONTROL?: YES

## 2019-10-10 RX ORDER — ACETAMINOPHEN 500 MG
TABLET ORAL
COMMUNITY

## 2019-10-10 RX ORDER — AZITHROMYCIN 250 MG/1
TABLET, FILM COATED ORAL
Qty: 6 TAB | Refills: 0 | Status: SHIPPED | OUTPATIENT
Start: 2019-10-10 | End: 2019-10-15

## 2019-10-10 NOTE — PROGRESS NOTES
HPI       Chief Complaint: Flu-like symptoms    Flory Chapman is a 67 y.o. female who presents with flu-like symptoms. Flu-like symptoms - fever, chills, body aches x 1 week. Was seen 10/6 and rapid flu was negative, diagnosed with viral illness but has continued to be febrile to 103 at home, continued chills, decreased appetite, severely worsening sore throat. Still taking PO ok despite throat pain. No drooling or voice changes. Has been taking Tylenol without much improvement - last dose was last night before bed, so over 12 hours ago. No cough. +Nasal congestion, rhinorrhea, mild headache. Denies N/V or SOB, CP. States she has never had strep before. Allergic to PCNs and sulfa, erythromycin. Denies antibiotics in the past 3 months. Says her allergy to PCN is she had hives and went to the hospital, eventually required oxygen. Per chart review she hasn't had cephalosporins prescribed here before. Neversmoker. PMHx:  Past Medical History:   Diagnosis Date    [facility]A (cerebral vascular [facility]) ([facility] 2009    Lupus ([facility]     Sjogren's disease ([facility]     Sjogren's syndrome ([facility]      Meds:   Current Outpatient Medications   Medication Sig Dispense Refill    acetaminophen (TYLENOL EXTRA STRENGTH) 500 mg tablet Take  by mouth every six (6) hours as needed for Pain.  azithromycin (ZITHROMAX) 250 mg tablet Take 2 tablets today, then take 1 tablet daily 6 Tab 0    zolpidem (AMBIEN) 5 mg tablet Take 1 Tab by mouth nightly as needed for Sleep. Max Daily Amount: 5 mg. 30 Tab 3    DULoxetine (CYMBALTA) 20 mg capsule Take 20 mg by mouth.  hydroxychloroquine (PLAQUENIL) 200 mg tablet Take  by mouth.  esomeprazole (NEXIUM) 20 mg capsule Take 20 mg by mouth.  rosuvastatin (CRESTOR) 5 mg tablet Take 1 Tab by mouth daily. 90 Tab 3    hydroCHLOROthiazide (MICROZIDE) 12.5 mg capsule Take 1 Cap by mouth daily.  90 Cap 3    fluticasone propionate (FLONASE) 50 mcg/actuation nasal spray 2 Sprays by Both Nostrils route daily. 1 Bottle 3     Allergies: Allergies   Allergen Reactions    Codeine Anaphylaxis    Penicillins Anaphylaxis    Adhesive Tape-Silicones Other (comments)    Amlodipine Swelling     Lower leg swelling    Methotrexate Contact Dermatitis    Povidone-Iodine Other (comments)    Sulfa (Sulfonamide Antibiotics) Nausea and Vomiting     ROS  Review of Systems   Constitutional: Positive for chills, fever and malaise/fatigue. Negative for diaphoresis. HENT: Positive for congestion and sore throat. Negative for ear pain and sinus pain. Eyes: Negative for pain and discharge. Respiratory: Negative for cough, shortness of breath and wheezing. Cardiovascular: Negative for chest pain, palpitations and leg swelling. Gastrointestinal: Negative for abdominal pain, nausea and vomiting. Musculoskeletal: Positive for myalgias. Skin: Negative for itching and rash. Neurological: Positive for headaches. Negative for dizziness, sensory change, speech change and focal weakness. Physical Exam:  Visit Vitals  /51   Pulse 75   Temp 99.5 °F (37.5 °C) (Oral)   Resp 18   Ht 5' 4\" (1.626 m)   Wt 156 lb (70.8 kg)   LMP 05/02/1984   SpO2 100%   BMI 26.78 kg/m²       Wt Readings from Last 3 Encounters:   10/10/19 156 lb (70.8 kg)   10/06/19 160 lb 12.8 oz (72.9 kg)   05/16/19 164 lb (74.4 kg)     BP Readings from Last 3 Encounters:   10/10/19 104/51   10/06/19 116/75   05/16/19 147/80      Physical Exam   Constitutional: She is oriented to person, place, and time. She appears well-developed and well-nourished. No distress. Nontoxic appearing   HENT:   Head: Normocephalic and atraumatic. Right Ear: External ear normal.   Left Ear: External ear normal.   Mouth/Throat: Oropharynx is clear and moist. No oropharyngeal exudate. No tonsillar swelling or exudates   Eyes: EOM are normal.   Neck: Normal range of motion. Neck supple. No thyromegaly present. No tender cervical lymphadenopathy. Throat mildly erythematous. No tonsillar swelling or exudates   Cardiovascular: Normal rate and regular rhythm. No murmur heard. Pulmonary/Chest: Effort normal and breath sounds normal. No respiratory distress. She has no wheezes. She has no rales. CTAB, excellent air movement   Abdominal: Soft. Bowel sounds are normal.   Lymphadenopathy:     She has no cervical adenopathy. Neurological: She is alert and oriented to person, place, and time. Skin: Skin is warm and dry. She is not diaphoretic. Psychiatric: She has a normal mood and affect. Vitals reviewed. I personally reviewed the following lab results:   Recent Results (from the past 12 hour(s))   AMB POC RAPID STREP A    Collection Time: 10/10/19 10:32 AM   Result Value Ref Range    VALID INTERNAL CONTROL POC Yes     Group A Strep Ag Positive Negative             Assessment     67 y.o. female with:    ICD-10-CM ICD-9-CM    1. Sore throat J02.9 462 AMB POC RAPID STREP A      azithromycin (ZITHROMAX) 250 mg tablet              Plan     1. Sore throat  Rapid strep positive. Pt with PCN allergy (hives, unsure if anaphylaxis but says she required hospitalization and oxygen). Third line per José Antonio First is azithromycin. Discussed ED precautions, RTC if no improvement  - AMB POC RAPID STREP A  - azithromycin (ZITHROMAX) 250 mg tablet; Take 2 tablets today, then take 1 tablet daily  Dispense: 6 Tab; Refill: 0      Follow-up and Dispositions    · Return if symptoms worsen or fail to improve. Patient seen and discussed with Dr. Tata Claudio (Attending Physician)    I have discussed the diagnosis with the patient and the intended plan as seen in the above orders. The patient has received an after-visit summary and questions were answered concerning future plans. I have discussed medication side effects and warnings with the patient as well.     Abdifatah Marie MD  Family Medicine Resident  PGY-3

## 2019-10-10 NOTE — PATIENT INSTRUCTIONS

## 2019-10-10 NOTE — PROGRESS NOTES
Chief Complaint   Patient presents with    Sore Throat     times one week    Generalized Body Aches    Chills     1. Have you been to the ER, urgent care clinic since your last visit? Hospitalized since your last visit? No    2. Have you seen or consulted any other health care providers outside of the [address] [place] [address] since your last visit? Include any pap smears or colon screening.  No

## 2019-11-29 ENCOUNTER — OFFICE VISIT (OUTPATIENT)
Dept: FAMILY MEDICINE CLINIC | Age: 79
End: 2019-11-29

## 2019-11-29 VITALS — RESPIRATION RATE: 17 BRPM | WEIGHT: 161.6 LBS | HEIGHT: 64 IN | BODY MASS INDEX: 27.59 KG/M2

## 2019-11-29 DIAGNOSIS — R05.9 COUGH: Primary | ICD-10-CM

## 2019-11-29 DIAGNOSIS — J30.89 ENVIRONMENTAL AND SEASONAL ALLERGIES: ICD-10-CM

## 2019-11-29 NOTE — PROGRESS NOTES
Chief Complaint   Patient presents with    Cough     sx for 4 weeks     1. Have you been to the ER, urgent care clinic since your last visit? Hospitalized since your last visit? No    2. Have you seen or consulted any other health care providers outside of the [address] [place] [address] since your last visit? Include any pap smears or colon screening.  No      Took robutussin--

## 2019-11-30 NOTE — PATIENT INSTRUCTIONS
Cough: Care Instructions Your Care Instructions A cough is your body's response to something that bothers your throat or airways. Many things can cause a cough. You might cough because of a cold or the flu, bronchitis, or asthma. Smoking, postnasal drip, allergies, and stomach acid that backs up into your throat also can cause coughs. A cough is a symptom, not a disease. Most coughs stop when the cause, such as a cold, goes away. You can take a few steps at home to cough less and feel better. Follow-up care is a key part of your treatment and safety. Be sure to make and go to all appointments, and call your doctor if you are having problems. It's also a good idea to know your test results and keep a list of the medicines you take. How can you care for yourself at home? · Drink lots of water and other fluids. This helps thin the mucus and soothes a dry or sore throat. Honey or lemon juice in hot water or tea may ease a dry cough. · Take cough medicine as directed by your doctor. · Prop up your head on pillows to help you breathe and ease a dry cough. · Try cough drops to soothe a dry or sore throat. Cough drops don't stop a cough. Medicine-flavored cough drops are no better than candy-flavored drops or hard candy. · Do not smoke. Avoid secondhand smoke. If you need help quitting, talk to your doctor about stop-smoking programs and medicines. These can increase your chances of quitting for good. When should you call for help? Call 911 anytime you think you may need emergency care.  For example, call if: 
  · You have severe trouble breathing.  
 Call your doctor now or seek immediate medical care if: 
  · You cough up blood.  
  · You have new or worse trouble breathing.  
  · You have a new or higher fever.  
  · You have a new rash.  
 Watch closely for changes in your health, and be sure to contact your doctor if: 
  · You cough more deeply or more often, especially if you notice more mucus or a change in the color of your mucus.  
  · You have new symptoms, such as a sore throat, an earache, or sinus pain.  
  · You do not get better as expected. Where can you learn more? Go to http://fredrick-rodriguez.info/. Enter D279 in the search box to learn more about \"Cough: Care Instructions. \" Current as of: June 9, 2019 Content Version: 12.2 © 0655-1617 [employer]. Care instructions adapted under license by [employer] (which disclaims liability or warranty for this information). If you have questions about a medical condition or this instruction, always ask your healthcare professional. [place] [address] any warranty or liability for your use of this information. Start antihistamine to dry up post nasal drip (zyrtec, claritin, or allegra without pseudoephedrine) -- take one daily For cough, continue robitussin with plenty of water; honey and tea also works well If sxs continue or you become febrile, go to the ED or come back to the office

## 2019-11-30 NOTE — PROGRESS NOTES
Subjective  Junaid Saldaña is an 67 y.o. female who presents for:    Cough for 4 days  + clear phlegm  Denies fever or chills  Denies nasal congestion, ear ache, sore throat    Had strep infection in October, treated with antibiotics    Has lupus    States was in bed for 3 weeks when she had strept infection    Never smoker    Allergies - reviewed: Allergies   Allergen Reactions    Codeine Anaphylaxis    Penicillins Anaphylaxis    Adhesive Tape-Silicones Other (comments)    Amlodipine Swelling     Lower leg swelling    Methotrexate Contact Dermatitis    Povidone-Iodine Other (comments)    Sulfa (Sulfonamide Antibiotics) Nausea and Vomiting         Medications - reviewed:   Current Outpatient Medications   Medication Sig    acetaminophen (TYLENOL EXTRA STRENGTH) 500 mg tablet Take  by mouth every six (6) hours as needed for Pain.  zolpidem (AMBIEN) 5 mg tablet Take 1 Tab by mouth nightly as needed for Sleep. Max Daily Amount: 5 mg.  DULoxetine (CYMBALTA) 20 mg capsule Take 20 mg by mouth.  hydroxychloroquine (PLAQUENIL) 200 mg tablet Take  by mouth.  esomeprazole (NEXIUM) 20 mg capsule Take 20 mg by mouth.  rosuvastatin (CRESTOR) 5 mg tablet Take 1 Tab by mouth daily.  hydroCHLOROthiazide (MICROZIDE) 12.5 mg capsule Take 1 Cap by mouth daily.  fluticasone propionate (FLONASE) 50 mcg/actuation nasal spray 2 Sprays by Both Nostrils route daily. No current facility-administered medications for this visit.         Problem List - reviewed:  Patient Active Problem List   Diagnosis Code    Essential hypertension I10    History of CVA (cerebrovascular accident) Z80.78    Sjogren's syndrome without extraglandular involvement ([address] M35.00    Systemic lupus erythematosus ([address] M32.9    Environmental and seasonal allergies J30.89    Hyperlipidemia E78.5    Primary insomnia F51.01    CKD (chronic kidney disease) stage 3, GFR 30-59 ml/min ([facility]) N18.3    Hyponatremia E87.1    Microcytic anemia D50.9         Past Medical History - reviewed:  Past Medical History:   Diagnosis Date    CVA (cerebral vascular accident) ([address] 2009    [facility]     [facility]'s disease ([facility]     Sjogren's syndrome ([facility]          Past Surgical History - reviewed:   Past Surgical History:   Procedure Laterality Date    HX BREAST REDUCTION      HX BUNIONECTOMY      HX HYSTERECTOMY      HX KNEE REPLACEMENT      HX LUMBAR FUSION      HX LUMBAR LAMINECTOMY      HX ROTATOR CUFF REPAIR           Social History - reviewed:  Social History     Socioeconomic History    Marital status:      Spouse name: Not on file    Number of children: Not on file    Years of education: Not on file    Highest education level: Not on file   Occupational History    Not on file   Social Needs    Financial resource strain: Not on file    Food insecurity:     Worry: Not on file     Inability: Not on file    Transportation needs:     Medical: Not on file     Non-medical: Not on file   Tobacco Use    Smoking status: Never Smoker    Smokeless tobacco: Never Used   Substance and Sexual Activity    Alcohol use: Yes     Comment: social    Drug use: Never    Sexual activity: Not Currently   Lifestyle    Physical activity:     Days per week: Not on file     Minutes per session: Not on file    Stress: Not on file   Relationships    Social connections:     Talks on phone: Not on file     Gets together: Not on file     Attends [employer] service: Not on file     Active member of club or organization: Not on file     Attends meetings of clubs or organizations: Not on file     Relationship status: Not on file    Intimate partner violence:     Fear of current or ex partner: Not on file     Emotionally abused: Not on file     Physically abused: Not on file     Forced sexual activity: Not on file   Other Topics Concern    Not on file   Social History Narrative    Not on file         Family History - reviewed:  Family History   Problem Relation Age of Onset    Diabetes Mother     Heart Attack Father          ROS  Review of Systems - see HPI    Physical Exam  Visit Vitals  BP (P) 159/78 (BP 1 Location: Right arm, BP Patient Position: Sitting)   Pulse (P) 93   Temp (P) 97.9 °F (36.6 °C) (Oral)   Resp 17   Ht 5' 4\" (1.626 m)   Wt 161 lb 9.6 oz (73.3 kg)   LMP 05/02/1984   SpO2 (P) 95%   BMI 27.74 kg/m²       General appearance - alert, awake  Eyes - pupils equal and reactive, extraocular eye movements intact  Ears - bilateral TM's and external ear canals normal  Nose - normal and patent, no erythema, + clear discharge  Mouth - mucous membranes moist, pharynx with posterior pharynx with drainage  Neck - supple, no significant adenopathy  Chest - clear to auscultation, no wheezes, rales or rhonchi, symmetric air entry  Heart - normal rate, regular rhythm, normal S1, S2, no murmurs  Psych - normal mood and affect       Assessment/Plan    ICD-10-CM ICD-9-CM    1. Cough R05 786.2    2. Environmental and seasonal allergies J30.89 477.8      Start antihistamine to dry up post nasal drip (zyrtec, claritin, or allegra without pseudoephedrine) -- take one daily    Use flonase as prescribed    For cough, continue robitussin with plenty of water; honey and tea also works well    If sxs continue or you become febrile, go to the ED or come back to the office    I have discussed the diagnosis with the patient and the intended plan as seen in the above orders. The patient has received an after-visit summary and questions were answered concerning future plans. I have discussed medication side effects and warnings with the patient as well.       Marilynn Macias MD

## 2020-01-31 DIAGNOSIS — F51.01 PRIMARY INSOMNIA: ICD-10-CM

## 2020-02-06 RX ORDER — ZOLPIDEM TARTRATE 5 MG/1
TABLET ORAL
Qty: 30 TAB | Refills: 2 | Status: SHIPPED | OUTPATIENT
Start: 2020-02-06 | End: 2020-04-24

## 2020-03-25 ENCOUNTER — HOSPITAL ENCOUNTER (OUTPATIENT)
Dept: LAB | Age: 80
Discharge: HOME | End: 2020-03-25

## 2020-03-25 ENCOUNTER — OFFICE VISIT (OUTPATIENT)
Dept: FAMILY MEDICINE CLINIC | Age: 80
End: 2020-03-25

## 2020-03-25 VITALS
HEART RATE: 80 BPM | RESPIRATION RATE: 18 BRPM | TEMPERATURE: 98.1 F | OXYGEN SATURATION: 99 % | SYSTOLIC BLOOD PRESSURE: 166 MMHG | BODY MASS INDEX: 27.49 KG/M2 | DIASTOLIC BLOOD PRESSURE: 72 MMHG | WEIGHT: 161 LBS | HEIGHT: 64 IN

## 2020-03-25 DIAGNOSIS — B96.89 BACTERIAL VAGINOSIS: Primary | ICD-10-CM

## 2020-03-25 DIAGNOSIS — N76.0 BACTERIAL VAGINOSIS: Primary | ICD-10-CM

## 2020-03-25 DIAGNOSIS — R30.0 DYSURIA: ICD-10-CM

## 2020-03-25 DIAGNOSIS — N39.0 URINARY TRACT INFECTION WITHOUT HEMATURIA, SITE UNSPECIFIED: ICD-10-CM

## 2020-03-25 LAB
BILIRUB UR QL STRIP: NEGATIVE
GLUCOSE UR-MCNC: NEGATIVE MG/DL
KETONES P FAST UR STRIP-MCNC: NEGATIVE MG/DL
PH UR STRIP: 7 [PH] (ref 4.6–8)
PROT UR QL STRIP: NEGATIVE
SP GR UR STRIP: 1.02 (ref 1–1.03)
UA UROBILINOGEN AMB POC: NORMAL (ref 0.2–1)
URINALYSIS CLARITY POC: CLEAR
URINALYSIS COLOR POC: YELLOW
URINE BLOOD POC: NEGATIVE
URINE LEUKOCYTES POC: NORMAL
URINE NITRITES POC: NEGATIVE
WET MOUNT POCT, WMPOCT: NORMAL

## 2020-03-25 RX ORDER — METRONIDAZOLE 500 MG/1
500 TABLET ORAL 2 TIMES DAILY
Qty: 14 TAB | Refills: 0 | Status: SHIPPED | OUTPATIENT
Start: 2020-03-25 | End: 2020-04-01

## 2020-03-25 NOTE — PATIENT INSTRUCTIONS
Bacterial Vaginosis: Care Instructions Your Care Instructions Bacterial vaginosis is a type of vaginal infection. It is caused by excess growth of certain bacteria that are normally found in the vagina. Symptoms can include itching, swelling, pain when you urinate or have sex, and a gray or yellow discharge with a \"fishy\" odor. It is not considered an infection that is spread through sexual contact. Although symptoms can be annoying and uncomfortable, bacterial vaginosis does not usually cause other health problems. However, if you have it while you are pregnant, it can cause complications. While the infection may go away on its own, most doctors use antibiotics to treat it. You may have been prescribed pills or vaginal cream. With treatment, bacterial vaginosis usually clears up in 5 to 7 days. Follow-up care is a key part of your treatment and safety. Be sure to make and go to all appointments, and call your doctor if you are having problems. It's also a good idea to know your test results and keep a list of the medicines you take. How can you care for yourself at home? · Take your antibiotics as directed. Do not stop taking them just because you feel better. You need to take the full course of antibiotics. · Do not eat or drink anything that contains alcohol if you are taking metronidazole (Flagyl). · Keep using your medicine if you start your period. Use pads instead of tampons while using a vaginal cream or suppository. Tampons can absorb the medicine. · Wear loose cotton clothing. Do not wear nylon and other materials that hold body heat and moisture close to the skin. · Do not scratch. Relieve itching with a cold pack or a cool bath. · Do not wash your vaginal area more than once a day. Use plain water or a mild, unscented soap. Do not douche. When should you call for help? Watch closely for changes in your health, and be sure to contact your doctor if:   · You have unexpected vaginal bleeding.  
  · You have a fever.  
  · You have new or increased pain in your vagina or pelvis.  
  · You are not getting better after 1 week.  
  · Your symptoms return after you finish the course of your medicine. Where can you learn more? Go to http://fredrick-rodriguez.info/ Clarke Kelly in the search box to learn more about \"Bacterial Vaginosis: Care Instructions. \" Current as of: November 7, 2019Content Version: 12.4 © 3101-2982 [employer]. Care instructions adapted under license by [employer] (which disclaims liability or warranty for this information). If you have questions about a medical condition or this instruction, always ask your healthcare professional. [address] any warranty or liability for your use of this information.

## 2020-03-25 NOTE — PROGRESS NOTES
[facility]    Subjective: Gt Lynn is a 79 y.o. female with history of HTN, CKD, CVA, sjogren's, SLE, HLD  CC: dysuria  History provided by patient    HPI:  Pt complains of burning with urination. She states that she feels the burning occurs when it contacts the skin. Her urine is dark. She denies hematuria, flank pain, fevers, chills, N/V. She has had a UTI before, and this feels similar. She had her last UTI about 6 months. She is not sexually active. She endorses associated suprapubic tenderness, frequency, and urgency in addition. She also complains of vaginal discharge that is causing itching and has an odor. It is a yellow, thin discharge that she has noticed on her underwear. This has been present for 4 days. PFSH:     Current Outpatient Medications on File Prior to Visit   Medication Sig Dispense Refill    zolpidem (AMBIEN) 5 mg tablet TAKE ONE TABLET BY MOUTH EVERY NIGHT AT BEDTIME AS NEEDED FOR SLEEP **DO NOT EXCEED 1 TABLET PER DAY** 30 Tab 2    acetaminophen (TYLENOL EXTRA STRENGTH) 500 mg tablet Take  by mouth every six (6) hours as needed for Pain.  DULoxetine (CYMBALTA) 20 mg capsule Take 20 mg by mouth.  hydroxychloroquine (PLAQUENIL) 200 mg tablet Take  by mouth.  esomeprazole (NEXIUM) 20 mg capsule Take 20 mg by mouth.  rosuvastatin (CRESTOR) 5 mg tablet Take 1 Tab by mouth daily. 90 Tab 3    hydroCHLOROthiazide (MICROZIDE) 12.5 mg capsule Take 1 Cap by mouth daily. 90 Cap 3    fluticasone propionate (FLONASE) 50 mcg/actuation nasal spray 2 Sprays by Both Nostrils route daily. 1 Bottle 3     No current facility-administered medications on file prior to visit.         Patient Active Problem List   Diagnosis Code    Essential hypertension I10    History of CVA (cerebrovascular accident) Z80.78    Sjogren's syndrome without extraglandular involvement ([address] M35.00    Systemic lupus erythematosus ([facility] M32.9    Environmental and seasonal allergies J30.89    Hyperlipidemia E78.5    Primary insomnia F51.01    CKD (chronic kidney disease) stage 3, GFR 30-59 ml/min ([facility]) N18.3    Hyponatremia E87.1    Microcytic anemia D50.9       Social History     Socioeconomic History    Marital status:      Spouse name: Not on file    Number of children: Not on file    Years of education: Not on file    Highest education level: Not on file   Occupational History    Not on file   Social Needs    Financial resource strain: Not on file    Food insecurity     Worry: Not on file     Inability: Not on file    Transportation needs     Medical: Not on file     Non-medical: Not on file   Tobacco Use    Smoking status: Never Smoker    Smokeless tobacco: Never Used   Substance and Sexual Activity    Alcohol use: Yes     Comment: social    Drug use: Never    Sexual activity: Not Currently   Lifestyle    Physical activity     Days per week: Not on file     Minutes per session: Not on file    Stress: Not on file   Relationships    Social connections     Talks on phone: Not on file     Gets together: Not on file     Attends Yarsani service: Not on file     Active member of club or organization: Not on file     Attends meetings of clubs or organizations: Not on file     Relationship status: Not on file    Intimate partner violence     Fear of current or ex partner: Not on file     Emotionally abused: Not on file     Physically abused: Not on file     Forced sexual activity: Not on file   Other Topics Concern    Not on file   Social History Narrative    Not on file       Review of Systems   Constitutional: Negative for chills, fever and malaise/fatigue. Gastrointestinal: Positive for abdominal pain. Negative for diarrhea, nausea and vomiting. Genitourinary: Positive for dysuria, frequency and urgency. Negative for flank pain and hematuria. Positive for vaginal discharge and odor. Musculoskeletal: Negative for back pain. Objective:     Visit Vitals  /72   Pulse 80   Temp 98.1 °F (36.7 °C)   Resp 18   Ht 5' 4\" (1.626 m)   Wt 161 lb (73 kg)   LMP 05/02/1984   SpO2 99%   BMI 27.64 kg/m²          Physical Exam  Constitutional:       Appearance: Normal appearance. HENT:      Head: Normocephalic and atraumatic. Abdominal:      General: Bowel sounds are normal. There is no distension. Palpations: Abdomen is soft. Tenderness: There is abdominal tenderness. There is no right CVA tenderness, left CVA tenderness, guarding or rebound. Comments: Mild TTP over suprapubic region   Genitourinary:     General: Normal vulva. Vagina: Vaginal discharge present. Comments: No erythema of vulva, vaginal walls, or cervix. Thin, yellow, malodorous discharge. Skin:     General: Skin is warm and dry. Neurological:      Mental Status: She is alert. Pertinent Labs/Studies: UA with 1+ LE, neg nitrites, neg blood. Wet prep positive for clue cells. Assessment and orders:       ICD-10-CM ICD-9-CM    1. Bacterial vaginosis N76.0 616.10 AMB POC SMEAR, STAIN & INTERPRET, WET MOUNT    B96.89 041.9    2. Dysuria R30.0 788.1 AMB POC URINALYSIS DIP STICK AUTO W/O MICRO      CULTURE, URINE     Encounter Diagnoses   Name Primary?  Bacterial vaginosis Yes    Dysuria      Diagnoses and all orders for this visit:    1. Bacterial Vaginosis - pt has malodorous, thin, itchy discharge with clue cells on wet mount with is characteristic of bacterial vaginosis. Pt has not had this previously. No concern for STI. Will treat with flagyl.  -     AMB POC SMEAR, STAIN & INTERPRET, WET MOUNT - positive for clue cells, personally reviewed  -     metroNIDAZOLE (FLAGYL) 500 mg tablet; Take 1 Tab by mouth two (2) times a day for 7 days. 2. Dysuria - this could be caused by pt's current episode of vaginitis as this is a confirmed diagnosis. Will send urine culture and assess if pt has bacteria growing in her urine in addition. If pt's urinary symptoms worsen or she develops a fever, she needs to call the office immediately. Will provide additional antibiotics if she has growth on culture, keeping in mind that she has anaphylaxis to penicillins and does not tolerate bactrim. -     AMB POC URINALYSIS DIP STICK AUTO W/O MICRO - positive for 1+ LE, negative for nitrites and blood. Personally reviewed  -     CULTURE, URINE; Future  -     Provided ER precautions including development of fever, palpitations, flank pain        Follow-up and Dispositions    · Return if symptoms worsen or fail to improve. I have discussed the diagnosis with the patient and the intended plan as seen in the above orders. Social history, medical history, and labs were reviewed. The patient has received an after-visit summary and questions were answered concerning future plans. I have discussed medication side effects and warnings with the patient as well.     Audrey Lorenzo MD  Resident [facility]  03/27/20

## 2020-03-26 LAB
BACTERIA SPEC CULT: ABNORMAL
CC UR VC: ABNORMAL
SERVICE CMNT-IMP: ABNORMAL

## 2020-03-27 ENCOUNTER — TELEPHONE (OUTPATIENT)
Dept: FAMILY MEDICINE CLINIC | Age: 80
End: 2020-03-27

## 2020-03-27 RX ORDER — NITROFURANTOIN 25; 75 MG/1; MG/1
100 CAPSULE ORAL 2 TIMES DAILY
Qty: 10 CAP | Refills: 0
Start: 2020-03-27 | End: 2020-04-01

## 2020-03-27 RX ORDER — CIPROFLOXACIN 500 MG/1
500 TABLET ORAL 2 TIMES DAILY
Qty: 6 TAB | Refills: 0 | Status: SHIPPED | OUTPATIENT
Start: 2020-03-27 | End: 2020-03-27 | Stop reason: SINTOL

## 2020-03-27 NOTE — TELEPHONE ENCOUNTER
Returned call with pharmacist. She discussed that fluoroquinolones are on the contraindicated list of medications to take while on plaquenil due to QTc prolongation. Even though it is typically okay to take short courses of medications that can have these side effects, it appears to be more of a significant warning in this situation. Given pt has anaphylaxis to penicillins, and I do not see documentation that she has tolerated a cephalosporin in the chart, it makes the choice of antibiotic more difficult. The bacteria that she grew on her urine culture was aerococcus urinae A. This is resistant to sulfa drugs. The last option that is feasible listed on the susceptibilities is macrobid. We typically avoid prescribing macrobid to elderly patients. Long term use can have systemic side effects, but short term use typically does not have these concerns. The concern for short term use is that as patients age, their creatinine clearance decreases, which may lead to the therapy being subtherapeutic. This bacteria is typically very susceptible to this antibiotic. In conclusion, I will cancel the ciprofloxacin prescription and send macrobid. If pt continues to have symptoms, she will need to call for further discussion of antibiotic choices.     Jose G Fuentes MD  Family Medicine Resident

## 2020-03-27 NOTE — TELEPHONE ENCOUNTER
Attempted to call pt to inform her that her urine culture was growing > 100,000 CFU of aerococcus urinae. I was treated her for BV already, but was awaiting urine culture results to assess if she had a concurrent UTI. When calling her provided number, a voice says \"the person you are calling is not accepting calls at this time. \" Will send in three days of ciprofloxacin for uncomplicated cystitis. Will also send mychart message since I have called twice and am unable to reach pt.     Yanet Ring MD  Family Medicine Resident

## 2020-03-27 NOTE — TELEPHONE ENCOUNTER
[employer] calling, notes drug interaction between    ciprofloxacin HCl (CIPRO) 500 mg tablet     hydroxychloroquine (PLAQUENIL) 200 mg tablet         Call [phone]

## 2020-03-27 NOTE — TELEPHONE ENCOUNTER
Still unable to reach pt by the provided phone number. If she calls, please inform her that I have sent in antibiotics to her pharmacy for UTI.       Matthias Balderas MD  Family Medicine Resident

## 2020-03-31 NOTE — TELEPHONE ENCOUNTER
Reached pt. Her urinary tract symptoms and vaginal discharge have resolved at this time. I indicated that pt should call office immediately if any symptoms return.       Dontrell Caceres MD  Family Medicine Resident

## 2020-04-18 DIAGNOSIS — F51.01 PRIMARY INSOMNIA: ICD-10-CM

## 2020-04-23 NOTE — TELEPHONE ENCOUNTER
----- Message from Asif Ramirez sent at 4/23/2020  9:23 AM EDT -----  Regarding: Dr. Camelia Fish (if not patient):  Relationship of caller (if not patient):  Best contact number(s): [phone]  Name of medication and dosage if known: Ambien 5 mg  Is patient out of this medication (yes/no): yes  Pharmacy name: markedup listed in chart? (yes/no): yes  Pharmacy phone number:on file  Date of last visit: 3/25/2020  Details to clarify the request: pharmacy faxed over refill request brit and never got anything back.

## 2020-04-24 RX ORDER — ZOLPIDEM TARTRATE 5 MG/1
TABLET ORAL
Qty: 30 TAB | Refills: 1 | Status: SHIPPED | OUTPATIENT
Start: 2020-04-24 | End: 2020-06-22 | Stop reason: SDUPTHER

## 2020-04-24 NOTE — TELEPHONE ENCOUNTER
Patient called for update and said she has been out of medication for 4 days. She has concerns; please call.     [phone]

## 2020-04-24 NOTE — TELEPHONE ENCOUNTER
Patient calling to office very upset and states she's been waiting for  to give her a call about her medication for sleep that hasn't been addressed in a week now. . Patient states she has lupus and when she can't sleep it causes her to become angry. I did explain that Dr. Barry Spear is in office this evening and that message would be sent.

## 2020-06-22 DIAGNOSIS — F51.01 PRIMARY INSOMNIA: ICD-10-CM

## 2020-06-24 NOTE — TELEPHONE ENCOUNTER
[phone]    Patient called for update. She also said she has been on this medication for ten years, and she does not understand why Dr. Patricia Hand only orders this medication as #30. She does want additional refills added but she said she never discussed this with Dr. Patricia Hand.

## 2020-06-25 ENCOUNTER — TELEPHONE (OUTPATIENT)
Dept: FAMILY MEDICINE CLINIC | Age: 80
End: 2020-06-25

## 2020-06-25 RX ORDER — ZOLPIDEM TARTRATE 5 MG/1
5 TABLET ORAL
Qty: 30 TAB | Refills: 2 | Status: SHIPPED | OUTPATIENT
Start: 2020-06-25 | End: 2020-09-25

## 2020-06-25 NOTE — TELEPHONE ENCOUNTER
This patient left another phone # for you to call her to discuss the medicine Dr Lesly Martinez would not fill. ... Please call her at this number. .([phone]). Juliano Brian

## 2020-06-25 NOTE — TELEPHONE ENCOUNTER
Called and spoke with patient. She states she does take this medication everyday and has been on it for a long time. Patient resistant to schedule a telemed visit because she is stable on this medication and has been for a while. I explained to her that this is a controlled substance and it requires routine follow up with a physician to be rx. Patient agreed and scheduled a telemed visit with Dr. Haile Pantoja 6/30/2020.

## 2020-06-25 NOTE — TELEPHONE ENCOUNTER
Last Rx in April for 60 days. Patient had previously stated she uses this intermittently. If patient out, this would indicate daily use. Request patient schedule a virtual visit to discuss ambien use.

## 2020-06-30 ENCOUNTER — TELEPHONE (OUTPATIENT)
Dept: FAMILY MEDICINE CLINIC | Age: 80
End: 2020-06-30

## 2020-06-30 NOTE — TELEPHONE ENCOUNTER
Patient was scheduled a telemedicine visit today to review ambien Rx. Multiple attempts to reach patient via phone and telemedicine were unsuccessful. Telephone \"not accepting phone calls at this time. \"  I recommend patient schedule a follow-up visit to discuss treatment of her insomnia.      Gamal Torre MD  6/30/2020 4:11 PM

## 2020-08-03 DIAGNOSIS — I10 ESSENTIAL HYPERTENSION: ICD-10-CM

## 2020-08-04 RX ORDER — HYDROCHLOROTHIAZIDE 12.5 MG/1
CAPSULE ORAL
Qty: 90 CAP | Refills: 0 | Status: SHIPPED | OUTPATIENT
Start: 2020-08-04

## 2020-09-16 ENCOUNTER — PATIENT MESSAGE (OUTPATIENT)
Dept: FAMILY MEDICINE CLINIC | Age: 80
End: 2020-09-16

## 2020-09-18 NOTE — TELEPHONE ENCOUNTER
We have no record of Mr. Danielle requesting a refill of her Nexium nor have we ever prescribed this medication to her. I suspect the pharmacy has sent her request to the prior prescribing provider. I have sent her [employer] message to clarify her comments.

## 2020-09-18 NOTE — TELEPHONE ENCOUNTER
From: Jericho Salmeron  To: Simona Manning MD  Sent: 9/16/2020 2:38 PM EDT  Subject: Prescription Question    Since you would not fill my Nexium I will not be returning to this practice.  This is an outrage

## 2020-09-20 DIAGNOSIS — F51.01 PRIMARY INSOMNIA: ICD-10-CM

## 2020-09-25 RX ORDER — ZOLPIDEM TARTRATE 5 MG/1
TABLET ORAL
Qty: 30 TAB | Refills: 1 | Status: SHIPPED | OUTPATIENT
Start: 2020-09-25

## 2020-11-09 ENCOUNTER — TELEPHONE (OUTPATIENT)
Dept: FAMILY MEDICINE CLINIC | Age: 80
End: 2020-11-09

## 2020-11-09 NOTE — TELEPHONE ENCOUNTER
Called again/ [address]  ===View-only below this line===  ----- Message -----  From: Dylon Juarez MD  Sent: 11/5/2020   8:59 AM EST  To: [facility] Front [facility]    ----- Message from Lianna Boyle MD sent at 11/5/2020  8:59 AM EST -----  There is a message from the patient indicating that she was changing practices. Can we call and clarify? I am going to refuse this med refill until we do. If she does still want to come here, she will need to schedule a visit to address her hypertension and to get labs but I can fill enough med to get her to her next appointment once scheduled. Thanks! Request received via interface.

## 2022-03-18 PROBLEM — M32.9 SYSTEMIC LUPUS ERYTHEMATOSUS (HCC): Status: ACTIVE | Noted: 2019-03-25

## 2022-03-19 PROBLEM — Z86.73 HISTORY OF CVA (CEREBROVASCULAR ACCIDENT): Status: ACTIVE | Noted: 2019-03-25

## 2022-03-19 PROBLEM — J30.89 ENVIRONMENTAL AND SEASONAL ALLERGIES: Status: ACTIVE | Noted: 2019-03-25

## 2022-03-19 PROBLEM — D50.9 MICROCYTIC ANEMIA: Status: ACTIVE | Noted: 2019-03-26

## 2022-03-19 PROBLEM — E78.5 HYPERLIPIDEMIA: Status: ACTIVE | Noted: 2019-03-25

## 2022-03-19 PROBLEM — M35.00 SJOGREN'S SYNDROME WITHOUT EXTRAGLANDULAR INVOLVEMENT (HCC): Status: ACTIVE | Noted: 2019-03-25

## 2022-03-19 PROBLEM — E87.1 HYPONATREMIA: Status: ACTIVE | Noted: 2019-03-26

## 2022-03-19 PROBLEM — N18.30 CKD (CHRONIC KIDNEY DISEASE) STAGE 3, GFR 30-59 ML/MIN (HCC): Status: ACTIVE | Noted: 2019-03-26

## 2022-03-20 PROBLEM — F51.01 PRIMARY INSOMNIA: Status: ACTIVE | Noted: 2019-03-25

## 2022-03-20 PROBLEM — I10 ESSENTIAL HYPERTENSION: Status: ACTIVE | Noted: 2019-03-25

## 2023-05-25 RX ORDER — ZOLPIDEM TARTRATE 5 MG/1
TABLET ORAL
COMMUNITY
Start: 2020-09-25

## 2023-05-25 RX ORDER — ROSUVASTATIN CALCIUM 5 MG/1
1 TABLET, COATED ORAL DAILY
COMMUNITY
Start: 2020-04-15

## 2023-05-25 RX ORDER — HYDROCHLOROTHIAZIDE 12.5 MG/1
1 CAPSULE, GELATIN COATED ORAL DAILY
COMMUNITY
Start: 2020-08-04

## 2023-05-25 RX ORDER — HYDROXYCHLOROQUINE SULFATE 200 MG/1
TABLET, FILM COATED ORAL
COMMUNITY

## 2023-05-25 RX ORDER — DULOXETIN HYDROCHLORIDE 20 MG/1
20 CAPSULE, DELAYED RELEASE ORAL
COMMUNITY

## 2023-05-25 RX ORDER — ACETAMINOPHEN 500 MG
TABLET ORAL EVERY 6 HOURS PRN
COMMUNITY

## 2023-05-25 RX ORDER — FLUTICASONE PROPIONATE 50 MCG
2 SPRAY, SUSPENSION (ML) NASAL DAILY
COMMUNITY
Start: 2019-03-25